# Patient Record
(demographics unavailable — no encounter records)

---

## 2025-01-10 NOTE — ASSESSMENT
[FreeTextEntry1] : #Type 2 DM  - Patient with history of type 2 DM since last year  - She is now on Mounjaro 2.5mg weekly since Oct 2024 at which time her A1c was 10% - Since then, she has changed her diet, been more active at the gym and lost 19lbs - A1c today is 6.7%  - She does mention however that she is having more anxiety than usual since being on the mounjaro. She is also seeing a therapist regarding this and seeing her PCP. Plan: - Explained that anxiety is usually not a side effect of the medication. Possible that patient is still getting used to the medication.  - Offered to switch medication to a different agent however patient would like to continue on the Mounjaro for another two months and reassess  - will continue with the Mounjaro for now   Counseled patient extensively about micro and macrovascular complications of diabetes include nephropathy, retinopathy and neuropathy. Patient aware of BG goal of  in the AM and <180 before meals. If BG consistently <80 or >180, to call office or see provider.

## 2025-01-10 NOTE — HISTORY OF PRESENT ILLNESS
[FreeTextEntry1] : #Diabetes Mellitus Type 2    Diagnosis- 2023 Current regimen: Mounjaro 2.5mg weekly - causing more anxiety   Other diabetic medications discontinued in the past: 1. MFM                            Reason for discontinuation: diarrhea   PCP/prior endocrinologist: Dr. Urbina  Signs/symptoms:  Last HgbA1c: 10%--> 6.7% today  Home blood sugars:  not checking at home  History of gestational diabetes: yes with both pregnancies 2003 and 2006  History of CAD: denies History of CVA: denies FH of diabetes: maternal and paternal grandmothers with type 2 DM  Diet:  Breakfast: cottage cheese, strawberries and protein shake  Lunch: protein and salad and vegetable Dinner: same as lunch Snacks: Denies  Drinks: water and green tea   Exercise:  type of exercise: yoga a couple times a week, weight training, walking   eGFR:     Alb/creat:  Follow with nephrology?   AST: ALT:  Last eye exam: UTD Evidence of retinopathy? Denies   Last foot exam: Due.  Any issues? Denies   Social History:  Alcohol: Denies Tobacco: Rarely  Work: Consulting   Surgical history: 2 c sections and Dec 2003- torn meniscus on right knee